# Patient Record
Sex: FEMALE | Race: WHITE
[De-identification: names, ages, dates, MRNs, and addresses within clinical notes are randomized per-mention and may not be internally consistent; named-entity substitution may affect disease eponyms.]

---

## 2017-07-11 ENCOUNTER — HOSPITAL ENCOUNTER (OUTPATIENT)
Dept: HOSPITAL 74 - FASU | Age: 77
Discharge: HOME | End: 2017-07-11
Payer: COMMERCIAL

## 2017-07-11 VITALS — SYSTOLIC BLOOD PRESSURE: 129 MMHG | DIASTOLIC BLOOD PRESSURE: 58 MMHG | HEART RATE: 57 BPM

## 2017-07-11 VITALS — BODY MASS INDEX: 27.4 KG/M2

## 2017-07-11 VITALS — TEMPERATURE: 97.5 F

## 2017-07-11 DIAGNOSIS — H26.8: Primary | ICD-10-CM

## 2017-07-11 PROCEDURE — 08RK3JZ REPLACEMENT OF LEFT LENS WITH SYNTHETIC SUBSTITUTE, PERCUTANEOUS APPROACH: ICD-10-PCS

## 2017-07-11 RX ADMIN — PHENYLEPHRINE HYDROCHLORIDE ONE DROP: 0.25 SPRAY NASAL at 11:35

## 2017-07-11 RX ADMIN — CYCLOPENTOLATE HYDROCHLORIDE ONE DROP: 10 SOLUTION/ DROPS OPHTHALMIC at 11:40

## 2017-07-11 RX ADMIN — FLURBIPROFEN SODIUM ONE DROP: 0.3 SOLUTION/ DROPS OPHTHALMIC at 11:40

## 2017-07-11 RX ADMIN — TROPICAMIDE ONE DROP: 10 SOLUTION/ DROPS OPHTHALMIC at 11:45

## 2017-07-11 RX ADMIN — FLURBIPROFEN SODIUM ONE DROP: 0.3 SOLUTION/ DROPS OPHTHALMIC at 11:45

## 2017-07-11 RX ADMIN — PHENYLEPHRINE HYDROCHLORIDE ONE DROP: 0.25 SPRAY NASAL at 11:30

## 2017-07-11 RX ADMIN — CYCLOPENTOLATE HYDROCHLORIDE ONE DROP: 10 SOLUTION/ DROPS OPHTHALMIC at 11:25

## 2017-07-11 RX ADMIN — TROPICAMIDE ONE DROP: 10 SOLUTION/ DROPS OPHTHALMIC at 11:40

## 2017-07-11 RX ADMIN — TROPICAMIDE ONE DROP: 10 SOLUTION/ DROPS OPHTHALMIC at 11:35

## 2017-07-11 RX ADMIN — GENTAMICIN SULFATE ONE DROP: 3 SOLUTION/ DROPS OPHTHALMIC at 11:40

## 2017-07-11 RX ADMIN — FLURBIPROFEN SODIUM ONE DROP: 0.3 SOLUTION/ DROPS OPHTHALMIC at 11:25

## 2017-07-11 RX ADMIN — FLURBIPROFEN SODIUM ONE DROP: 0.3 SOLUTION/ DROPS OPHTHALMIC at 11:35

## 2017-07-11 RX ADMIN — CYCLOPENTOLATE HYDROCHLORIDE ONE DROP: 10 SOLUTION/ DROPS OPHTHALMIC at 11:35

## 2017-07-11 RX ADMIN — FLURBIPROFEN SODIUM ONE DROP: 0.3 SOLUTION/ DROPS OPHTHALMIC at 11:30

## 2017-07-11 RX ADMIN — GENTAMICIN SULFATE ONE DROP: 3 SOLUTION/ DROPS OPHTHALMIC at 11:45

## 2017-07-11 RX ADMIN — PHENYLEPHRINE HYDROCHLORIDE ONE DROP: 0.25 SPRAY NASAL at 11:25

## 2017-07-11 RX ADMIN — TROPICAMIDE ONE DROP: 10 SOLUTION/ DROPS OPHTHALMIC at 11:30

## 2017-07-11 RX ADMIN — CYCLOPENTOLATE HYDROCHLORIDE ONE DROP: 10 SOLUTION/ DROPS OPHTHALMIC at 11:30

## 2017-07-11 RX ADMIN — CYCLOPENTOLATE HYDROCHLORIDE ONE DROP: 10 SOLUTION/ DROPS OPHTHALMIC at 11:45

## 2017-07-11 RX ADMIN — GENTAMICIN SULFATE ONE DROP: 3 SOLUTION/ DROPS OPHTHALMIC at 11:35

## 2017-07-11 RX ADMIN — PHENYLEPHRINE HYDROCHLORIDE ONE DROP: 0.25 SPRAY NASAL at 11:45

## 2017-07-11 RX ADMIN — GENTAMICIN SULFATE ONE DROP: 3 SOLUTION/ DROPS OPHTHALMIC at 11:25

## 2017-07-11 RX ADMIN — TROPICAMIDE ONE DROP: 10 SOLUTION/ DROPS OPHTHALMIC at 11:25

## 2017-07-11 RX ADMIN — GENTAMICIN SULFATE ONE DROP: 3 SOLUTION/ DROPS OPHTHALMIC at 11:30

## 2017-07-11 RX ADMIN — PHENYLEPHRINE HYDROCHLORIDE ONE DROP: 0.25 SPRAY NASAL at 11:40

## 2017-09-26 ENCOUNTER — HOSPITAL ENCOUNTER (OUTPATIENT)
Dept: HOSPITAL 74 - FASU | Age: 77
Discharge: HOME | End: 2017-09-26
Payer: COMMERCIAL

## 2017-09-26 VITALS — HEART RATE: 54 BPM | DIASTOLIC BLOOD PRESSURE: 85 MMHG | SYSTOLIC BLOOD PRESSURE: 130 MMHG

## 2017-09-26 VITALS — BODY MASS INDEX: 27.4 KG/M2

## 2017-09-26 VITALS — TEMPERATURE: 97.9 F

## 2017-09-26 DIAGNOSIS — H26.8: Primary | ICD-10-CM

## 2017-09-26 PROCEDURE — 08RJ3JZ REPLACEMENT OF RIGHT LENS WITH SYNTHETIC SUBSTITUTE, PERCUTANEOUS APPROACH: ICD-10-PCS

## 2017-09-26 RX ADMIN — PHENYLEPHRINE HYDROCHLORIDE ONE DROP: 0.25 SPRAY NASAL at 10:50

## 2017-09-26 RX ADMIN — TROPICAMIDE ONE DROP: 10 SOLUTION/ DROPS OPHTHALMIC at 11:10

## 2017-09-26 RX ADMIN — GENTAMICIN SULFATE ONE DROP: 3 SOLUTION/ DROPS OPHTHALMIC at 11:10

## 2017-09-26 RX ADMIN — TROPICAMIDE ONE DROP: 10 SOLUTION/ DROPS OPHTHALMIC at 10:55

## 2017-09-26 RX ADMIN — PHENYLEPHRINE HYDROCHLORIDE ONE DROP: 0.25 SPRAY NASAL at 11:00

## 2017-09-26 RX ADMIN — PHENYLEPHRINE HYDROCHLORIDE ONE DROP: 0.25 SPRAY NASAL at 11:05

## 2017-09-26 RX ADMIN — TROPICAMIDE ONE DROP: 10 SOLUTION/ DROPS OPHTHALMIC at 11:00

## 2017-09-26 RX ADMIN — CYCLOPENTOLATE HYDROCHLORIDE ONE DROP: 10 SOLUTION/ DROPS OPHTHALMIC at 11:00

## 2017-09-26 RX ADMIN — GENTAMICIN SULFATE ONE DROP: 3 SOLUTION/ DROPS OPHTHALMIC at 10:55

## 2017-09-26 RX ADMIN — KETOROLAC TROMETHAMINE SCH DROP: 5 SOLUTION OPHTHALMIC at 10:55

## 2017-09-26 RX ADMIN — PHENYLEPHRINE HYDROCHLORIDE ONE DROP: 0.25 SPRAY NASAL at 10:55

## 2017-09-26 RX ADMIN — GENTAMICIN SULFATE ONE DROP: 3 SOLUTION/ DROPS OPHTHALMIC at 11:00

## 2017-09-26 RX ADMIN — TROPICAMIDE ONE DROP: 10 SOLUTION/ DROPS OPHTHALMIC at 10:50

## 2017-09-26 RX ADMIN — CYCLOPENTOLATE HYDROCHLORIDE ONE DROP: 10 SOLUTION/ DROPS OPHTHALMIC at 10:55

## 2017-09-26 RX ADMIN — TROPICAMIDE ONE DROP: 10 SOLUTION/ DROPS OPHTHALMIC at 11:05

## 2017-09-26 RX ADMIN — KETOROLAC TROMETHAMINE SCH DROP: 5 SOLUTION OPHTHALMIC at 11:10

## 2017-09-26 RX ADMIN — PHENYLEPHRINE HYDROCHLORIDE ONE DROP: 0.25 SPRAY NASAL at 11:10

## 2017-09-26 RX ADMIN — KETOROLAC TROMETHAMINE SCH DROP: 5 SOLUTION OPHTHALMIC at 11:00

## 2017-09-26 RX ADMIN — KETOROLAC TROMETHAMINE SCH DROP: 5 SOLUTION OPHTHALMIC at 11:05

## 2017-09-26 RX ADMIN — CYCLOPENTOLATE HYDROCHLORIDE ONE DROP: 10 SOLUTION/ DROPS OPHTHALMIC at 10:50

## 2017-09-26 RX ADMIN — CYCLOPENTOLATE HYDROCHLORIDE ONE DROP: 10 SOLUTION/ DROPS OPHTHALMIC at 11:05

## 2017-09-26 RX ADMIN — GENTAMICIN SULFATE ONE DROP: 3 SOLUTION/ DROPS OPHTHALMIC at 10:50

## 2017-09-26 RX ADMIN — KETOROLAC TROMETHAMINE SCH DROP: 5 SOLUTION OPHTHALMIC at 10:50

## 2017-09-26 RX ADMIN — GENTAMICIN SULFATE ONE DROP: 3 SOLUTION/ DROPS OPHTHALMIC at 11:05

## 2017-09-26 RX ADMIN — CYCLOPENTOLATE HYDROCHLORIDE ONE DROP: 10 SOLUTION/ DROPS OPHTHALMIC at 11:10

## 2022-04-27 ENCOUNTER — TRANSCRIPTION ENCOUNTER (OUTPATIENT)
Age: 82
End: 2022-04-27

## 2022-04-27 PROBLEM — Z00.00 ENCOUNTER FOR PREVENTIVE HEALTH EXAMINATION: Status: ACTIVE | Noted: 2022-04-27

## 2022-04-29 ENCOUNTER — APPOINTMENT (OUTPATIENT)
Dept: DISASTER EMERGENCY | Facility: HOSPITAL | Age: 82
End: 2022-04-29

## 2024-07-08 ENCOUNTER — HOSPITAL ENCOUNTER (EMERGENCY)
Dept: HOSPITAL 74 - FER | Age: 84
Discharge: HOME | End: 2024-07-08
Payer: COMMERCIAL

## 2024-07-08 VITALS — RESPIRATION RATE: 16 BRPM | SYSTOLIC BLOOD PRESSURE: 157 MMHG | DIASTOLIC BLOOD PRESSURE: 68 MMHG | HEART RATE: 60 BPM

## 2024-07-08 VITALS — BODY MASS INDEX: 27.3 KG/M2

## 2024-07-08 DIAGNOSIS — T63.441A: ICD-10-CM

## 2024-07-08 DIAGNOSIS — S70.361A: ICD-10-CM

## 2024-07-08 DIAGNOSIS — S50.362A: ICD-10-CM

## 2024-07-08 DIAGNOSIS — S50.361A: Primary | ICD-10-CM
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
## 2024-07-08 DIAGNOSIS — S60.862A: ICD-10-CM

## 2024-07-08 PROCEDURE — 3E033GC INTRODUCTION OF OTHER THERAPEUTIC SUBSTANCE INTO PERIPHERAL VEIN, PERCUTANEOUS APPROACH: ICD-10-PCS

## 2024-07-08 RX ADMIN — SODIUM CHLORIDE STA MLS/HR: 9 INJECTION, SOLUTION INTRAVENOUS at 18:30

## 2024-07-08 RX ADMIN — FAMOTIDINE ONE MLS/HR: 20 INJECTION, SOLUTION INTRAVENOUS at 18:45

## 2024-07-08 RX ADMIN — METHYLPREDNISOLONE SODIUM SUCCINATE ONE MG: 125 INJECTION, POWDER, FOR SOLUTION INTRAMUSCULAR; INTRAVENOUS at 18:40

## 2024-07-10 ENCOUNTER — HOSPITAL ENCOUNTER (EMERGENCY)
Dept: HOSPITAL 74 - FER | Age: 84
Discharge: HOME | End: 2024-07-10
Payer: COMMERCIAL

## 2024-07-10 VITALS
DIASTOLIC BLOOD PRESSURE: 67 MMHG | SYSTOLIC BLOOD PRESSURE: 149 MMHG | TEMPERATURE: 98.1 F | HEART RATE: 68 BPM | RESPIRATION RATE: 18 BRPM

## 2024-07-10 VITALS — BODY MASS INDEX: 24.5 KG/M2

## 2024-07-10 DIAGNOSIS — T63.441A: ICD-10-CM

## 2024-07-10 DIAGNOSIS — M79.89: Primary | ICD-10-CM

## 2024-08-04 ENCOUNTER — NON-APPOINTMENT (OUTPATIENT)
Age: 84
End: 2024-08-04

## 2024-08-05 ENCOUNTER — APPOINTMENT (OUTPATIENT)
Dept: PEDIATRIC ORTHOPEDIC SURGERY | Facility: CLINIC | Age: 84
End: 2024-08-05

## 2024-08-05 PROCEDURE — 73110 X-RAY EXAM OF WRIST: CPT | Mod: 26

## 2024-08-05 PROCEDURE — 73562 X-RAY EXAM OF KNEE 3: CPT | Mod: 26

## 2024-08-05 PROCEDURE — 73140 X-RAY EXAM OF FINGER(S): CPT | Mod: F8

## 2024-08-05 PROCEDURE — 99203 OFFICE O/P NEW LOW 30 MIN: CPT

## 2024-08-05 NOTE — PHYSICAL EXAM
[de-identified] : Examination today reveals she is ambulating with minimal antalgic component to her gait the right knee has mild soft tissue swelling with good motion no clinical instability.  Minor discomfort is noted in both medial lateral compartments neurovascular status is intact.  With regards to the right wrist and hand she has obvious pre-existing deformities to the fingers with the appearance of a swan-neck deformity.  The ring finger however has swelling in the region of the PIP joint which is hyperextended and she has poor flexion present.  The MP joint is nontender as is the DIP joint.  I can passively flex the PIP joint to approximately 45 degrees.  There is also evidence of pre-existing chronic mild swelling to the thumb carpometacarpal joint without obvious instability clinically.  Review of outside x-rays right wrist right hand right knee July 21, 2024 revealed mild degenerative changes with chondrocalcinosis of the menisci to the knee.  No fractures.  The right wrist and hand reveal pre-existing carpometacarpal arthritis of the thumb.  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  The ring finger has evidence of what appears to be an impaction fracture of the articular surface of the middle phalanx with questionable dorsal subluxation.

## 2024-08-05 NOTE — ASSESSMENT
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  Fracture middle phalanx right ring finger.  The knee will be treated symptomatically as she is doing reasonably well with this.  With regards to the right ring finger I have applied a dorsal splint maintaining the PIP joint in approximately 30 degrees of flexion.  I have advised consultation with a hand surgeon which she already has the name of.

## 2024-08-05 NOTE — HISTORY OF PRESENT ILLNESS
[de-identified] : This 84-year-old patient with a history of Ehlos Danlos syndrome is seen for evaluation of the right hand and right knee.  This patient was well until approximately 2-1/2 weeks ago when she tripped and fell sustaining injury.  She noted pain with mild swelling and stiffness to the knee though she has been progressing nicely and is ambulating with only mild discomfort.  No locking or buckling.  With regards to her hand she has chronic pre-existing deformity as result of her connective tissue disease and she at this time the most pressing complaint is the right ring finger where she has swelling along with stiffness and difficulty with motion.  She was seen at an outside facility and x-rays of the right wrist hand and knee were performed.